# Patient Record
Sex: FEMALE | Race: BLACK OR AFRICAN AMERICAN | NOT HISPANIC OR LATINO | URBAN - METROPOLITAN AREA
[De-identification: names, ages, dates, MRNs, and addresses within clinical notes are randomized per-mention and may not be internally consistent; named-entity substitution may affect disease eponyms.]

---

## 2021-05-18 ENCOUNTER — WEB ENCOUNTER (OUTPATIENT)
Dept: URBAN - METROPOLITAN AREA CLINIC 50 | Facility: CLINIC | Age: 61
End: 2021-05-18

## 2021-05-18 ENCOUNTER — OFFICE VISIT (OUTPATIENT)
Dept: URBAN - METROPOLITAN AREA CLINIC 50 | Facility: CLINIC | Age: 61
End: 2021-05-18
Payer: SELF-PAY

## 2021-05-18 VITALS
HEIGHT: 65 IN | DIASTOLIC BLOOD PRESSURE: 92 MMHG | WEIGHT: 198 LBS | HEART RATE: 57 BPM | SYSTOLIC BLOOD PRESSURE: 143 MMHG | TEMPERATURE: 97 F | BODY MASS INDEX: 32.99 KG/M2

## 2021-05-18 DIAGNOSIS — Z12.11 COLON CANCER SCREENING: ICD-10-CM

## 2021-05-18 DIAGNOSIS — K76.0 FATTY LIVER: ICD-10-CM

## 2021-05-18 DIAGNOSIS — R10.33 PERIUMBILICAL ABDOMINAL PAIN: ICD-10-CM

## 2021-05-18 DIAGNOSIS — K21.9 GASTROESOPHAGEAL REFLUX DISEASE, UNSPECIFIED WHETHER ESOPHAGITIS PRESENT: ICD-10-CM

## 2021-05-18 PROCEDURE — 99244 OFF/OP CNSLTJ NEW/EST MOD 40: CPT | Performed by: INTERNAL MEDICINE

## 2021-05-18 PROCEDURE — 99204 OFFICE O/P NEW MOD 45 MIN: CPT | Performed by: INTERNAL MEDICINE

## 2021-05-18 NOTE — HPI-TODAY'S VISIT:
referred by DR. Laura duong for screening colonoscopy copy of the note was sent to referring MD saw her in 2017 for GERD more than 5 years ago had a colonoscopy was having some chest pain, saw DR. Gtz, everyting was normal cardiac TTE, stress test diverticulitis in 2013 abd pain started a month ago, saw pcp and did u/s of GB, normal, did show fatty liver has some anxiety

## 2021-05-27 ENCOUNTER — LAB OUTSIDE AN ENCOUNTER (OUTPATIENT)
Dept: URBAN - METROPOLITAN AREA CLINIC 96 | Facility: CLINIC | Age: 61
End: 2021-05-27

## 2021-05-27 LAB
CREATININE POC: 1
PERFORMING LAB: (no result)

## 2021-06-03 ENCOUNTER — TELEPHONE ENCOUNTER (OUTPATIENT)
Dept: URBAN - METROPOLITAN AREA CLINIC 92 | Facility: CLINIC | Age: 61
End: 2021-06-03

## 2021-06-03 RX ORDER — SODIUM, POTASSIUM,MAG SULFATES 17.5-3.13G
354 ML SOLUTION, RECONSTITUTED, ORAL ORAL ONCE
Qty: 1 | Refills: 0 | OUTPATIENT
Start: 2021-06-03 | End: 2021-06-04

## 2021-06-09 ENCOUNTER — TELEPHONE ENCOUNTER (OUTPATIENT)
Dept: URBAN - METROPOLITAN AREA CLINIC 96 | Facility: CLINIC | Age: 61
End: 2021-06-09

## 2021-07-15 ENCOUNTER — OFFICE VISIT (OUTPATIENT)
Dept: URBAN - METROPOLITAN AREA MEDICAL CENTER 28 | Facility: MEDICAL CENTER | Age: 61
End: 2021-07-15
Payer: SELF-PAY

## 2021-07-15 DIAGNOSIS — Z12.11 COLON CANCER SCREENING: ICD-10-CM

## 2021-07-15 DIAGNOSIS — D12.3 ADENOMA OF TRANSVERSE COLON: ICD-10-CM

## 2021-07-15 DIAGNOSIS — R10.13 ABDOMINAL DISCOMFORT, EPIGASTRIC: ICD-10-CM

## 2021-07-15 DIAGNOSIS — K29.60 ADENOPAPILLOMATOSIS GASTRICA: ICD-10-CM

## 2021-07-15 PROCEDURE — 45380 COLONOSCOPY AND BIOPSY: CPT | Performed by: INTERNAL MEDICINE

## 2021-07-15 PROCEDURE — 43239 EGD BIOPSY SINGLE/MULTIPLE: CPT | Performed by: INTERNAL MEDICINE

## 2022-09-08 ENCOUNTER — P2P PATIENT RECORD (OUTPATIENT)
Age: 62
End: 2022-09-08

## 2022-09-12 ENCOUNTER — TELEPHONE ENCOUNTER (OUTPATIENT)
Dept: URBAN - METROPOLITAN AREA CLINIC 6 | Facility: CLINIC | Age: 62
End: 2022-09-12

## 2022-09-14 ENCOUNTER — WEB ENCOUNTER (OUTPATIENT)
Dept: URBAN - METROPOLITAN AREA CLINIC 96 | Facility: CLINIC | Age: 62
End: 2022-09-14

## 2022-09-14 ENCOUNTER — LAB OUTSIDE AN ENCOUNTER (OUTPATIENT)
Dept: URBAN - METROPOLITAN AREA TELEHEALTH 2 | Facility: TELEHEALTH | Age: 62
End: 2022-09-14

## 2022-09-14 ENCOUNTER — TELEPHONE ENCOUNTER (OUTPATIENT)
Dept: URBAN - METROPOLITAN AREA CLINIC 96 | Facility: CLINIC | Age: 62
End: 2022-09-14

## 2022-09-14 ENCOUNTER — OFFICE VISIT (OUTPATIENT)
Dept: URBAN - METROPOLITAN AREA TELEHEALTH 2 | Facility: TELEHEALTH | Age: 62
End: 2022-09-14
Payer: SELF-PAY

## 2022-09-14 VITALS — HEIGHT: 65 IN | BODY MASS INDEX: 31.49 KG/M2 | WEIGHT: 189 LBS

## 2022-09-14 DIAGNOSIS — R10.13 EPIGASTRIC PAIN: ICD-10-CM

## 2022-09-14 DIAGNOSIS — Z86.19 HISTORY OF HELICOBACTER PYLORI INFECTION: ICD-10-CM

## 2022-09-14 DIAGNOSIS — R11.2 NAUSEA AND VOMITING, UNSPECIFIED VOMITING TYPE: ICD-10-CM

## 2022-09-14 DIAGNOSIS — K21.9 GASTROESOPHAGEAL REFLUX DISEASE, UNSPECIFIED WHETHER ESOPHAGITIS PRESENT: ICD-10-CM

## 2022-09-14 PROCEDURE — 99214 OFFICE O/P EST MOD 30 MIN: CPT

## 2022-09-14 RX ORDER — PANTOPRAZOLE SODIUM 40 MG/1
1 TABLET TABLET, DELAYED RELEASE ORAL
Qty: 60 TABLET | Refills: 1 | OUTPATIENT
Start: 2022-09-14

## 2022-09-14 RX ORDER — SUCRALFATE 1 G/1
1 TABLET ON AN EMPTY STOMACH TABLET ORAL TWICE A DAY
Qty: 60 | Refills: 1 | OUTPATIENT
Start: 2022-09-14 | End: 2022-11-12

## 2022-09-14 NOTE — PHYSICAL EXAM CONSTITUTIONAL:
pleasant, well nourished, well developed, appears uncomfortable but not in acute distress , normal communication ability

## 2022-09-14 NOTE — HPI-TODAY'S VISIT:
Patient is a 62-year-old female who presents to follow-up from recent hospitalization.   Last colonoscopy completed 7/15/2021 by Dr. Sanchez.  1, 3 mm polyp at the hepatic flexure.  Diverticulosis in the rectosigmoid colon, in the sigmoid colon and in the descending colon.  Nonbleeding internal hemorrhoids.  Pathology result revealed tubular adenoma.   Upper endoscopy also completed on 7/15/2021 which revealed normal-appearing duodenum.  Erythematous mucosa in the antrum.  Small hiatal hernia and normal-appearing esophagus.  Pathology results revealed mild chronic gastritis.  Negative for H. pylori or intestinal metaplasia. 6 weeks ago developed epigastric abdominal pain- described as gnawing pain  Described as constant with very few episodes of improvement  Patient can radiate to the "naval" Asscoiated nausea  Worsening pain last Friday with associated vomiting and went to the ER Had CT scan of abdomen and pelvis which did not show any acute process Labs unremarkable including CBC, CMP, BNP, negative troponis and respiratory/viral PCR panel  Given IV fluids, anti emetics, IV analgesia, GI cocktail and PPI  Prior to onset of worsening pain 6 weeks ago, patient states she had not been on PPI as she felt her reflux had improved  ER advised her to resume pantoprazole 40 mg but has not taken- picked up the Rx yesterday  Has been trying to eat soup and broth as well as crackers Denies hematemsis Denies fever but can have chills with pain and vomiting Denies BRBPR or melena
done

## 2022-09-15 ENCOUNTER — WEB ENCOUNTER (OUTPATIENT)
Dept: URBAN - METROPOLITAN AREA CLINIC 96 | Facility: CLINIC | Age: 62
End: 2022-09-15

## 2022-09-15 ENCOUNTER — TELEPHONE ENCOUNTER (OUTPATIENT)
Dept: URBAN - METROPOLITAN AREA CLINIC 6 | Facility: CLINIC | Age: 62
End: 2022-09-15

## 2022-09-16 ENCOUNTER — WEB ENCOUNTER (OUTPATIENT)
Dept: URBAN - METROPOLITAN AREA CLINIC 96 | Facility: CLINIC | Age: 62
End: 2022-09-16

## 2022-09-19 ENCOUNTER — TELEPHONE ENCOUNTER (OUTPATIENT)
Dept: URBAN - METROPOLITAN AREA CLINIC 92 | Facility: CLINIC | Age: 62
End: 2022-09-19

## 2022-09-19 ENCOUNTER — OFFICE VISIT (OUTPATIENT)
Dept: URBAN - METROPOLITAN AREA MEDICAL CENTER 28 | Facility: MEDICAL CENTER | Age: 62
End: 2022-09-19
Payer: SELF-PAY

## 2022-09-19 DIAGNOSIS — K29.60 ADENOPAPILLOMATOSIS GASTRICA: ICD-10-CM

## 2022-09-19 DIAGNOSIS — R10.13 ABDOMINAL DISCOMFORT, EPIGASTRIC: ICD-10-CM

## 2022-09-19 PROCEDURE — 43239 EGD BIOPSY SINGLE/MULTIPLE: CPT | Performed by: INTERNAL MEDICINE

## 2022-09-19 RX ORDER — SUCRALFATE 1 G/1
1 TABLET ON AN EMPTY STOMACH TABLET ORAL TWICE A DAY
Qty: 60 | Refills: 1 | Status: ACTIVE | COMMUNITY
Start: 2022-09-14 | End: 2022-11-12

## 2022-09-19 RX ORDER — PANTOPRAZOLE SODIUM 40 MG/1
1 TABLET TABLET, DELAYED RELEASE ORAL
Qty: 60 TABLET | Refills: 1 | Status: ACTIVE | COMMUNITY
Start: 2022-09-14

## 2022-09-22 ENCOUNTER — OFFICE VISIT (OUTPATIENT)
Dept: URBAN - METROPOLITAN AREA CLINIC 95 | Facility: CLINIC | Age: 62
End: 2022-09-22
Payer: SELF-PAY

## 2022-09-22 ENCOUNTER — TELEPHONE ENCOUNTER (OUTPATIENT)
Dept: URBAN - METROPOLITAN AREA CLINIC 96 | Facility: CLINIC | Age: 62
End: 2022-09-22

## 2022-09-22 DIAGNOSIS — K76.89 HEPATIC CYST: ICD-10-CM

## 2022-09-22 DIAGNOSIS — R10.13 EPIGASTRIC PAIN: ICD-10-CM

## 2022-09-22 DIAGNOSIS — N28.1 RENAL CYST: ICD-10-CM

## 2022-09-22 PROCEDURE — 76700 US EXAM ABDOM COMPLETE: CPT | Performed by: INTERNAL MEDICINE

## 2022-09-22 RX ORDER — PANTOPRAZOLE SODIUM 40 MG/1
1 TABLET TABLET, DELAYED RELEASE ORAL
Qty: 60 TABLET | Refills: 1 | Status: ACTIVE | COMMUNITY
Start: 2022-09-14

## 2022-09-22 RX ORDER — SUCRALFATE 1 G/1
1 TABLET ON AN EMPTY STOMACH TABLET ORAL TWICE A DAY
Qty: 60 | Refills: 1 | Status: ACTIVE | COMMUNITY
Start: 2022-09-14 | End: 2022-11-12

## 2022-09-28 PROBLEM — 85057007 HEPATIC CYST: Status: ACTIVE | Noted: 2022-09-28

## 2022-10-03 ENCOUNTER — WEB ENCOUNTER (OUTPATIENT)
Dept: URBAN - METROPOLITAN AREA CLINIC 96 | Facility: CLINIC | Age: 62
End: 2022-10-03

## 2022-10-05 ENCOUNTER — WEB ENCOUNTER (OUTPATIENT)
Dept: URBAN - METROPOLITAN AREA CLINIC 96 | Facility: CLINIC | Age: 62
End: 2022-10-05

## 2022-10-13 ENCOUNTER — OFFICE VISIT (OUTPATIENT)
Dept: URBAN - METROPOLITAN AREA CLINIC 98 | Facility: CLINIC | Age: 62
End: 2022-10-13

## 2022-10-31 ENCOUNTER — CLAIMS CREATED FROM THE CLAIM WINDOW (OUTPATIENT)
Dept: URBAN - METROPOLITAN AREA TELEHEALTH 2 | Facility: TELEHEALTH | Age: 62
End: 2022-10-31
Payer: SELF-PAY

## 2022-10-31 ENCOUNTER — TELEPHONE ENCOUNTER (OUTPATIENT)
Dept: URBAN - METROPOLITAN AREA CLINIC 96 | Facility: CLINIC | Age: 62
End: 2022-10-31

## 2022-10-31 VITALS — HEIGHT: 65 IN | WEIGHT: 189 LBS | BODY MASS INDEX: 31.49 KG/M2

## 2022-10-31 DIAGNOSIS — K29.00 ACUTE GASTRITIS WITHOUT HEMORRHAGE, UNSPECIFIED GASTRITIS TYPE: ICD-10-CM

## 2022-10-31 DIAGNOSIS — R10.13 EPIGASTRIC PAIN: ICD-10-CM

## 2022-10-31 PROCEDURE — 99213 OFFICE O/P EST LOW 20 MIN: CPT | Performed by: INTERNAL MEDICINE

## 2022-10-31 RX ORDER — PANTOPRAZOLE SODIUM 40 MG/1
1 TABLET TABLET, DELAYED RELEASE ORAL
Qty: 60 TABLET | Refills: 1 | COMMUNITY
Start: 2022-09-14

## 2022-10-31 RX ORDER — SUCRALFATE 1 G/1
1 TABLET ON AN EMPTY STOMACH TABLET ORAL TWICE A DAY
Qty: 60 | Refills: 1 | COMMUNITY
Start: 2022-09-14 | End: 2022-11-12

## 2022-10-31 NOTE — HPI-TODAY'S VISIT:
has influenza. just finished her tamiflu. still w/ nausea. appetite is not good. still taking protonix and carafate. since egd on 9/19 (gastritis, no h.pylori) saw dr. brewer noncalcified plaque 40-50%---on lipitor. LDL goal < 70. currently on event monitor. CT coronary done. PCP wants a CXR  ========================================== Patient is a 62-year-old female who presents to follow-up from recent hospitalization.   Last colonoscopy completed 7/15/2021 by Dr. Sanchez.  1, 3 mm polyp at the hepatic flexure.  Diverticulosis in the rectosigmoid colon, in the sigmoid colon and in the descending colon.  Nonbleeding internal hemorrhoids.  Pathology result revealed tubular adenoma.   Upper endoscopy also completed on 7/15/2021 which revealed normal-appearing duodenum.  Erythematous mucosa in the antrum.  Small hiatal hernia and normal-appearing esophagus.  Pathology results revealed mild chronic gastritis.  Negative for H. pylori or intestinal metaplasia. 6 weeks ago developed epigastric abdominal pain- described as gnawing pain  Described as constant with very few episodes of improvement  Patient can radiate to the "naval" Asscoiated nausea  Worsening pain last Friday with associated vomiting and went to the ER Had CT scan of abdomen and pelvis which did not show any acute process Labs unremarkable including CBC, CMP, BNP, negative troponis and respiratory/viral PCR panel  Given IV fluids, anti emetics, IV analgesia, GI cocktail and PPI  Prior to onset of worsening pain 6 weeks ago, patient states she had not been on PPI as she felt her reflux had improved  ER advised her to resume pantoprazole 40 mg but has not taken- picked up the Rx yesterday  Has been trying to eat soup and broth as well as crackers Denies hematemsis Denies fever but can have chills with pain and vomiting Denies BRBPR or melena

## 2023-01-09 ENCOUNTER — OFFICE VISIT (OUTPATIENT)
Dept: URBAN - METROPOLITAN AREA TELEHEALTH 2 | Facility: TELEHEALTH | Age: 63
End: 2023-01-09

## 2023-01-09 ENCOUNTER — DASHBOARD ENCOUNTERS (OUTPATIENT)
Age: 63
End: 2023-01-09

## 2023-01-09 VITALS — WEIGHT: 189 LBS | HEIGHT: 65 IN | BODY MASS INDEX: 31.49 KG/M2

## 2023-01-09 RX ORDER — PANTOPRAZOLE SODIUM 40 MG/1
1 TABLET TABLET, DELAYED RELEASE ORAL
Qty: 60 TABLET | Refills: 1 | Status: ACTIVE | COMMUNITY
Start: 2022-09-14

## 2023-01-10 ENCOUNTER — OFFICE VISIT (OUTPATIENT)
Dept: URBAN - METROPOLITAN AREA TELEHEALTH 2 | Facility: TELEHEALTH | Age: 63
End: 2023-01-10
Payer: SELF-PAY

## 2023-01-10 VITALS — BODY MASS INDEX: 31.65 KG/M2 | WEIGHT: 190 LBS | HEIGHT: 65 IN

## 2023-01-10 DIAGNOSIS — R10.13 EPIGASTRIC PAIN: ICD-10-CM

## 2023-01-10 DIAGNOSIS — K76.0 FATTY LIVER: ICD-10-CM

## 2023-01-10 DIAGNOSIS — K29.00 ACUTE GASTRITIS WITHOUT HEMORRHAGE, UNSPECIFIED GASTRITIS TYPE: ICD-10-CM

## 2023-01-10 PROBLEM — 197321007: Status: ACTIVE | Noted: 2021-05-18

## 2023-01-10 PROCEDURE — 99213 OFFICE O/P EST LOW 20 MIN: CPT | Performed by: INTERNAL MEDICINE

## 2023-01-10 RX ORDER — PANTOPRAZOLE SODIUM 40 MG/1
1 TABLET TABLET, DELAYED RELEASE ORAL
Qty: 60 TABLET | Refills: 1 | Status: ACTIVE | COMMUNITY
Start: 2022-09-14

## 2023-01-10 NOTE — HPI-TODAY'S VISIT:
still having pain in the center of her stomach a knot in the middle of the stomach. gerd is better on protonix not on carafate still feels a knot in her stomach    ====================================================================== has influenza. just finished her tamiflu. still w/ nausea. appetite is not good. still taking protonix and carafate. since egd on 9/19 (gastritis, no h.pylori) saw dr. brewer noncalcified plaque 40-50%---on lipitor. LDL goal < 70. currently on event monitor. CT coronary done. PCP wants a CXR  ========================================== Patient is a 62-year-old female who presents to follow-up from recent hospitalization.   Last colonoscopy completed 7/15/2021 by Dr. Sanchez.  1, 3 mm polyp at the hepatic flexure.  Diverticulosis in the rectosigmoid colon, in the sigmoid colon and in the descending colon.  Nonbleeding internal hemorrhoids.  Pathology result revealed tubular adenoma.   Upper endoscopy also completed on 7/15/2021 which revealed normal-appearing duodenum.  Erythematous mucosa in the antrum.  Small hiatal hernia and normal-appearing esophagus.  Pathology results revealed mild chronic gastritis.  Negative for H. pylori or intestinal metaplasia. 6 weeks ago developed epigastric abdominal pain- described as gnawing pain  Described as constant with very few episodes of improvement  Patient can radiate to the "naval" Asscoiated nausea  Worsening pain last Friday with associated vomiting and went to the ER Had CT scan of abdomen and pelvis which did not show any acute process Labs unremarkable including CBC, CMP, BNP, negative troponis and respiratory/viral PCR panel  Given IV fluids, anti emetics, IV analgesia, GI cocktail and PPI  Prior to onset of worsening pain 6 weeks ago, patient states she had not been on PPI as she felt her reflux had improved  ER advised her to resume pantoprazole 40 mg but has not taken- picked up the Rx yesterday  Has been trying to eat soup and broth as well as crackers Denies hematemsis Denies fever but can have chills with pain and vomiting Denies BRBPR or melena
